# Patient Record
Sex: MALE | Race: WHITE
[De-identification: names, ages, dates, MRNs, and addresses within clinical notes are randomized per-mention and may not be internally consistent; named-entity substitution may affect disease eponyms.]

---

## 2018-12-27 ENCOUNTER — HOSPITAL ENCOUNTER (EMERGENCY)
Dept: HOSPITAL 74 - JER | Age: 23
Discharge: HOME | End: 2018-12-27
Payer: SELF-PAY

## 2018-12-27 VITALS — DIASTOLIC BLOOD PRESSURE: 98 MMHG | TEMPERATURE: 97.9 F | HEART RATE: 89 BPM | SYSTOLIC BLOOD PRESSURE: 128 MMHG

## 2018-12-27 VITALS — BODY MASS INDEX: 37.3 KG/M2
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
## 2018-12-27 DIAGNOSIS — S01.01XA: Primary | ICD-10-CM
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
## 2018-12-27 PROCEDURE — 0HQ0XZZ REPAIR SCALP SKIN, EXTERNAL APPROACH: ICD-10-PCS

## 2018-12-27 NOTE — PDOC
History of Present Illness





- General


Chief Complaint: Alcohol intoxication


Stated Complaint: FALL,POSSIBLE INTOX


Time Seen by Provider: 12/27/18 04:46





Past History





- Past Medical History


Allergies/Adverse Reactions: 


 Allergies











Allergy/AdvReac Type Severity Reaction Status Date / Time


 


No Known Allergies Allergy   Verified 12/27/18 04:48











Home Medications: 


Ambulatory Orders





NK [No Known Home Medication]  12/27/18 











- Suicide/Smoking/Psychosocial Hx


Smoking History: Current some day smoker


Have you smoked in the past 12 months: Yes


Information on smoking cessation initiated: No


Hx Alcohol Use: Yes


Drug/Substance Use Hx: No





*Physical Exam





- Vital Signs


 Last Vital Signs











Temp Pulse Resp BP Pulse Ox


 


 97.9 F   89   18   128/98   98 


 


 12/27/18 04:38  12/27/18 04:38  12/27/18 04:38  12/27/18 04:38  12/27/18 04:38














Moderate Sedation





- Procedure Monitoring


Vital Signs: 


Procedure Monitoring Vital Signs











Temperature  97.9 F   12/27/18 04:38


 


Pulse Rate  89   12/27/18 04:38


 


Respiratory Rate  18   12/27/18 04:38


 


Blood Pressure  128/98   12/27/18 04:38


 


O2 Sat by Pulse Oximetry (%)  98   12/27/18 04:38











*DC/Admit/Observation/Transfer





- Discharge Dispostion


Condition at time of disposition: Fair





- Referrals





- Patient Instructions





- Post Discharge Activity

## 2018-12-27 NOTE — PDOC
Attending Attestation





- Resident


Resident Name: KonstantinAlexia





- ED Attending Attestation


I have performed the following: I have examined & evaluated the patient, The 

case was reviewed & discussed with the resident, I agree w/resident's findings 

& plan, Exceptions are as noted





- HPI


HPI: 





12/27/18 05:53


23M here with acute etoh intox s/p head trauma.  Wife witnessed patient 

stumbling and falling to the ground where he hit his head on a steel surface.  

Immediately after he had a short self-limited episode being obtunded with 

apparent convulsion. No post-ictal period, no incontinence, tongue biting





- Physicial Exam


PE: 





12/27/18 05:55


Agree with exam as documented by resident





- Medical Decision Making





12/27/18 05:55


Intox trauma, concern for intracranial injury


f/u ct b c-





dispo per clinical course





12/27/18 05:56


Clinically sober, strong, stable gait, normal base, no ataxia


CT B unremarkable, no acute injury


Will dc with parents who are at bedside

## 2018-12-27 NOTE — PDOC
History of Present Illness





- General


Chief Complaint: Alcohol intoxication


Stated Complaint: FALL,POSSIBLE INTOX


Time Seen by Provider: 12/27/18 04:46


History Source: Patient


Exam Limitations: No Limitations





- History of Present Illness


Initial Comments: 





12/27/18 04:57


23YOM with unremarkable PMH who p/w alcohol intoxication and a head injury. The 

patient states that his wife told him he fell and hit his head on a steel plate

, cutting the back right scalp. The patient's parents express concern that he 

was drunk, fell to the ground and hit his head, possibly vomited a small amount 

of mucus, then started convulsing on the floor for several seconds and his face 

turned blue. They deny any known h/o seizure disorder or other medical issues.





Past History





- Past Medical History


Allergies/Adverse Reactions: 


 Allergies











Allergy/AdvReac Type Severity Reaction Status Date / Time


 


No Known Allergies Allergy   Verified 12/27/18 04:48











Home Medications: 


Ambulatory Orders





NK [No Known Home Medication]  12/27/18 











- Suicide/Smoking/Psychosocial Hx


Smoking History: Current some day smoker


Have you smoked in the past 12 months: Yes


Information on smoking cessation initiated: No


Hx Alcohol Use: Yes


Drug/Substance Use Hx: No





**Review of Systems





- Review of Systems


Able to Perform ROS?: Yes


Comments:: 





12/27/18 05:10


GEN: no fever, chills, malaise, generalized weakness, or weight change


HEENT: no ear pain, sore throat, vision change, or eye pain


CV: no chest pain, palpitations, lightheadedness, syncope, or edema


RESP: no cough, wheezing, or SOB


GI: no abdominal pain, nausea, vomiting, diarrhea, constipation, or white/black/

bloody stool


: no dysuria, hematuria, incontinence, retention, bleeding, or discharge 


MSK: no neck/back pain, muscle weakness/pain, or joint swelling/pain


NEURO: no headache, seizure, vertigo, numbness, tingling, or focal weakness


PSYCH: substance use, no behavior change


SKIN: scalp laceration, no jaundice, no rash


ROS otherwise negative except as noted in HPI





*Physical Exam





- Vital Signs


 Last Vital Signs











Temp Pulse Resp BP Pulse Ox


 


 97.9 F   89   18   128/98   98 


 


 12/27/18 04:38  12/27/18 04:38  12/27/18 04:38  12/27/18 04:38  12/27/18 04:38











12/27/18 05:08


TRAUMA ASSESSMENT: protecting airway, equal bilateral breath sounds, abdomen 

soft, pelvis stable, no thigh hematoma, no midline vertebral tenderness C/T/L 

spine, no spinal step-off or deformity, no back hematoma, moving all extremities

, no cephalohematoma, no raccoon eyes, no smith sign, no hemotympanum, no CSF 

rhinorrhea/otorrhea, no jaw malocclusion


GENERAL: very well-appearing but very intoxicated young adult male, odor of 

alcohol present, slurred speech, A/Ox4, no distress, answers questions 

appropriately, making jokes and laughing, parents are at bedside assisting 

answering questions


HEENT: right posterior scalp laceration which is 1 cm, PERRLA, EOMI, moist 

mucous membranes


NECK/BACK: no midline ttp, no spinal stepoff or deformity, no hematoma, full ROM

, neck supple


CARDIOVASCULAR: regular rate/rhythm, normal S1S2, no MGR, strong peripheral 

pulses, capillary refill <2 seconds, extremities wwp, no edema


LUNGS/RESPIRATORY: no respiratory distress, CTAB


GI/ABDOMEN: symmetric side-to-side, normoactive BS, soft, no ttp, no midline 

pulsatile masses


: no CVA tenderness


EXTREMITIES: no muscle atrophy, no acute deformity, no edema


SKIN: scalp laceration as noted in HEENT, warm and dry, no pallor, no jaundice, 

no rash, no bruising, no skin breakdown, no cuts, no lesions


NEUROLOGICAL: GCS 15, CN II-XII grossly intact, 5/5 strength proximally and 

distally, no facial droop





Moderate Sedation





- Procedure Monitoring


Vital Signs: 


Procedure Monitoring Vital Signs











Temperature  97.9 F   12/27/18 04:38


 


Pulse Rate  89   12/27/18 04:38


 


Respiratory Rate  18   12/27/18 04:38


 


Blood Pressure  128/98   12/27/18 04:38


 


O2 Sat by Pulse Oximetry (%)  98   12/27/18 04:38











ED Treatment Course





- RADIOLOGY


Radiology Studies Ordered: 














 Category Date Time Status


 


 HEAD CT WITHOUT CONTRAST [CT] Stat CT Scan  12/27/18 04:56 Ordered














Medical Decision Making





- Medical Decision Making





12/27/18 05:03


Pt p/w EtOH intoxication, fall, and head injury.





 Initial Vital Signs











Temp Pulse Resp BP Pulse Ox


 


 97.9 F   89   18   128/98   98 


 


 12/27/18 04:38  12/27/18 04:38  12/27/18 04:38  12/27/18 04:38  12/27/18 04:38








Exam: As noted in Physical Exam section.


DDX IBNLT: scalp contusion with laceration, concussion, ICH, skull fracture, 

etc.


W/U ordered: Head CT


TX ordered: None at this time





Head CT: 





Laceration repaired by Dr. Sandoval with #3 staples after copious irrigation, 

patient tolerated well.





Reassessment: Patient walking with normal gait unassisted to bathroom.


Parents are comfortable taking the patient home to metabolize.





12/27/18 05:48


The Pt has gotten significant relief of symptoms while in the ED.


They have been observed without AMS, n/v, LOC, or focal neuro findings in the 

ED.


Workup is not concerning for emergency-level pathology at this time.


The Pt is appropriate for discharge home with parents.


They are comfortable with this plan and will follow up at  or here in the ED 

in 5 days for staple removal.


Return precautions for concussion and CHI are discussed and they will come back 

to the ER if necessary.





*DC/Admit/Observation/Transfer


Diagnosis at time of Disposition: 


 Fall from ground level





Alcohol intoxication


Qualifiers:


 Complication of substance-induced condition: uncomplicated Qualified Code(s): 

F10.920 - Alcohol use, unspecified with intoxication, uncomplicated





Laceration of scalp without complication


Qualifiers:


 Encounter type: initial encounter Qualified Code(s): S01.01XA - Laceration 

without foreign body of scalp, initial encounter








- Discharge Dispostion


Disposition: HOME


Condition at time of disposition: Stable


Decision to Admit order: No





- Referrals





- Patient Instructions


Printed Discharge Instructions:  DI for Closed Head Injury


Additional Instructions: 


You were seen in the ER for a fall and a head injury. We did a head CT, and 

cleaned and stapled your scalp laceration. After our assessment, we do not 

believe you are having a medical emergency at this time, and we believe you are 

safe to go home. Please follow up with your primary care provider in 1-3 days. 

Call their clinic as soon as possible, tell them you were seen in the ER, and 

tell them you need an appointment. If you have any new or worsening symptoms, 

please come back to the ER at any time (24 hours a day). Especially come back 

for loss of consciousness, numbness, tingling, or weakness of one side or part 

of your body. Read through the concussion and head injury discharge 

instructions we are including in this information packet. If you are having 

severe or life threatening symptoms, or symptoms that make it unsafe to drive 

or have someone drive you, please call 911.





Please read the information in this packet on how to care for your laceration. 

Keep the laceration clean and dry, with only ointment on it, for 24 hours. 

After 24 hours, you can take the dressing off and you can shower and get the 

wound wet, but do not scrub the wound and do not soak it in water. Do not go 

swimming or take a bath or submerge the wound in standing water. Put a thin 

layer of antibiotic ointment on the wound once in the morning and once in the 

evening. You should have your staples removed in 5 days, you can go to urgent 

care or come back here to the ER to have them removed. If you have any new or 

worsening symptoms, especially fainting, palpitations, chest discomfort, 

shortness of breath, sweats, nausea, skin redness or signs of laceration of the 

infection, or other symptoms, please come back to the ER at any time (24 hours 

a day). If you are having severe or life threatening symptoms, or symptoms that 

make it unsafe to drive or have someone drive you, please call 911.





- Post Discharge Activity